# Patient Record
Sex: FEMALE
[De-identification: names, ages, dates, MRNs, and addresses within clinical notes are randomized per-mention and may not be internally consistent; named-entity substitution may affect disease eponyms.]

---

## 2017-02-24 ENCOUNTER — OTHER (OUTPATIENT)
Age: 32
End: 2017-02-24

## 2017-04-12 ENCOUNTER — RECORD ABSTRACTING (OUTPATIENT)
Age: 32
End: 2017-04-12

## 2017-04-12 DIAGNOSIS — Z83.42 FAMILY HISTORY OF FAMILIAL HYPERCHOLESTEROLEMIA: ICD-10-CM

## 2017-04-12 DIAGNOSIS — Z80.42 FAMILY HISTORY OF MALIGNANT NEOPLASM OF PROSTATE: ICD-10-CM

## 2017-04-12 DIAGNOSIS — Z78.9 OTHER SPECIFIED HEALTH STATUS: ICD-10-CM

## 2017-04-12 DIAGNOSIS — Z31.69 ENCOUNTER FOR OTHER GENERAL COUNSELING AND ADVICE ON PROCREATION: ICD-10-CM

## 2017-05-02 ENCOUNTER — APPOINTMENT (OUTPATIENT)
Dept: MATERNAL FETAL MEDICINE | Facility: CLINIC | Age: 32
End: 2017-05-02

## 2017-05-02 ENCOUNTER — APPOINTMENT (OUTPATIENT)
Dept: ANTEPARTUM | Facility: CLINIC | Age: 32
End: 2017-05-02

## 2017-05-02 VITALS
BODY MASS INDEX: 20.14 KG/M2 | WEIGHT: 118 LBS | HEIGHT: 64 IN | SYSTOLIC BLOOD PRESSURE: 96 MMHG | DIASTOLIC BLOOD PRESSURE: 62 MMHG

## 2017-05-02 DIAGNOSIS — Z91.89 OTHER SPECIFIED PERSONAL RISK FACTORS, NOT ELSEWHERE CLASSIFIED: ICD-10-CM

## 2017-05-03 PROBLEM — Z91.89 AT RISK FOR INFECTIOUS DISEASE DUE TO RECENT FOREIGN TRAVEL: Status: ACTIVE | Noted: 2017-05-03

## 2017-08-23 ENCOUNTER — RESULT REVIEW (OUTPATIENT)
Age: 32
End: 2017-08-23

## 2017-08-31 ENCOUNTER — APPOINTMENT (OUTPATIENT)
Dept: ULTRASOUND IMAGING | Facility: CLINIC | Age: 32
End: 2017-08-31
Payer: COMMERCIAL

## 2017-08-31 ENCOUNTER — OUTPATIENT (OUTPATIENT)
Dept: OUTPATIENT SERVICES | Facility: HOSPITAL | Age: 32
LOS: 1 days | End: 2017-08-31
Payer: COMMERCIAL

## 2017-08-31 DIAGNOSIS — Z00.8 ENCOUNTER FOR OTHER GENERAL EXAMINATION: ICD-10-CM

## 2017-08-31 PROCEDURE — 76641 ULTRASOUND BREAST COMPLETE: CPT

## 2017-08-31 PROCEDURE — 76641 ULTRASOUND BREAST COMPLETE: CPT | Mod: 26,50

## 2017-09-21 ENCOUNTER — RX RENEWAL (OUTPATIENT)
Age: 32
End: 2017-09-21

## 2017-12-13 ENCOUNTER — APPOINTMENT (OUTPATIENT)
Dept: ANTEPARTUM | Facility: CLINIC | Age: 32
End: 2017-12-13
Payer: COMMERCIAL

## 2017-12-13 ENCOUNTER — ASOB RESULT (OUTPATIENT)
Age: 32
End: 2017-12-13

## 2017-12-13 PROCEDURE — 76811 OB US DETAILED SNGL FETUS: CPT

## 2018-04-27 ENCOUNTER — INPATIENT (INPATIENT)
Facility: HOSPITAL | Age: 33
LOS: 1 days | Discharge: HOME | End: 2018-04-29
Attending: OBSTETRICS & GYNECOLOGY | Admitting: OBSTETRICS & GYNECOLOGY
Payer: COMMERCIAL

## 2018-04-27 VITALS
OXYGEN SATURATION: 100 % | RESPIRATION RATE: 16 BRPM | DIASTOLIC BLOOD PRESSURE: 52 MMHG | HEART RATE: 69 BPM | TEMPERATURE: 98 F | SYSTOLIC BLOOD PRESSURE: 103 MMHG

## 2018-04-27 DIAGNOSIS — Z34.80 ENCOUNTER FOR SUPERVISION OF OTHER NORMAL PREGNANCY, UNSPECIFIED TRIMESTER: ICD-10-CM

## 2018-04-27 DIAGNOSIS — O26.899 OTHER SPECIFIED PREGNANCY RELATED CONDITIONS, UNSPECIFIED TRIMESTER: ICD-10-CM

## 2018-04-27 DIAGNOSIS — Z3A.00 WEEKS OF GESTATION OF PREGNANCY NOT SPECIFIED: ICD-10-CM

## 2018-04-27 LAB
BASOPHILS # BLD AUTO: 0 K/UL — SIGNIFICANT CHANGE UP (ref 0–0.2)
BASOPHILS NFR BLD AUTO: 0.3 % — SIGNIFICANT CHANGE UP (ref 0–2)
BLD GP AB SCN SERPL QL: NEGATIVE — SIGNIFICANT CHANGE UP
EOSINOPHIL # BLD AUTO: 0 K/UL — SIGNIFICANT CHANGE UP (ref 0–0.5)
EOSINOPHIL NFR BLD AUTO: 0.1 % — SIGNIFICANT CHANGE UP (ref 0–6)
HCT VFR BLD CALC: 36.8 % — SIGNIFICANT CHANGE UP (ref 34.5–45)
HGB BLD-MCNC: 12.7 G/DL — SIGNIFICANT CHANGE UP (ref 11.5–15.5)
LYMPHOCYTES # BLD AUTO: 1.1 K/UL — SIGNIFICANT CHANGE UP (ref 1–3.3)
LYMPHOCYTES # BLD AUTO: 12.1 % — LOW (ref 13–44)
MCHC RBC-ENTMCNC: 31 PG — SIGNIFICANT CHANGE UP (ref 27–34)
MCHC RBC-ENTMCNC: 34.5 GM/DL — SIGNIFICANT CHANGE UP (ref 32–36)
MCV RBC AUTO: 89.9 FL — SIGNIFICANT CHANGE UP (ref 80–100)
MONOCYTES # BLD AUTO: 0.6 K/UL — SIGNIFICANT CHANGE UP (ref 0–0.9)
MONOCYTES NFR BLD AUTO: 6.6 % — SIGNIFICANT CHANGE UP (ref 2–14)
NEUTROPHILS # BLD AUTO: 7.1 K/UL — SIGNIFICANT CHANGE UP (ref 1.8–7.4)
NEUTROPHILS NFR BLD AUTO: 80.8 % — HIGH (ref 43–77)
PLATELET # BLD AUTO: 123 K/UL — LOW (ref 150–400)
RBC # BLD: 4.09 M/UL — SIGNIFICANT CHANGE UP (ref 3.8–5.2)
RBC # FLD: 11.7 % — SIGNIFICANT CHANGE UP (ref 10.3–14.5)
RH IG SCN BLD-IMP: POSITIVE — SIGNIFICANT CHANGE UP
WBC # BLD: 8.8 K/UL — SIGNIFICANT CHANGE UP (ref 3.8–10.5)
WBC # FLD AUTO: 8.8 K/UL — SIGNIFICANT CHANGE UP (ref 3.8–10.5)

## 2018-04-27 RX ORDER — AMPICILLIN TRIHYDRATE 250 MG
2 CAPSULE ORAL ONCE
Qty: 0 | Refills: 0 | Status: COMPLETED | OUTPATIENT
Start: 2018-04-27 | End: 2018-04-27

## 2018-04-27 RX ORDER — DIBUCAINE 1 %
1 OINTMENT (GRAM) RECTAL EVERY 4 HOURS
Qty: 0 | Refills: 0 | Status: DISCONTINUED | OUTPATIENT
Start: 2018-04-27 | End: 2018-04-28

## 2018-04-27 RX ORDER — DOCUSATE SODIUM 100 MG
100 CAPSULE ORAL
Qty: 0 | Refills: 0 | Status: DISCONTINUED | OUTPATIENT
Start: 2018-04-28 | End: 2018-04-29

## 2018-04-27 RX ORDER — HYDROCORTISONE 1 %
1 OINTMENT (GRAM) TOPICAL EVERY 4 HOURS
Qty: 0 | Refills: 0 | Status: DISCONTINUED | OUTPATIENT
Start: 2018-04-27 | End: 2018-04-28

## 2018-04-27 RX ORDER — TETANUS TOXOID, REDUCED DIPHTHERIA TOXOID AND ACELLULAR PERTUSSIS VACCINE, ADSORBED 5; 2.5; 8; 8; 2.5 [IU]/.5ML; [IU]/.5ML; UG/.5ML; UG/.5ML; UG/.5ML
0.5 SUSPENSION INTRAMUSCULAR ONCE
Qty: 0 | Refills: 0 | Status: DISCONTINUED | OUTPATIENT
Start: 2018-04-28 | End: 2018-04-29

## 2018-04-27 RX ORDER — LANOLIN
1 OINTMENT (GRAM) TOPICAL EVERY 6 HOURS
Qty: 0 | Refills: 0 | Status: DISCONTINUED | OUTPATIENT
Start: 2018-04-28 | End: 2018-04-29

## 2018-04-27 RX ORDER — GLYCERIN ADULT
1 SUPPOSITORY, RECTAL RECTAL AT BEDTIME
Qty: 0 | Refills: 0 | Status: DISCONTINUED | OUTPATIENT
Start: 2018-04-28 | End: 2018-04-29

## 2018-04-27 RX ORDER — OXYTOCIN 10 UNIT/ML
41.67 VIAL (ML) INJECTION
Qty: 20 | Refills: 0 | Status: DISCONTINUED | OUTPATIENT
Start: 2018-04-28 | End: 2018-04-29

## 2018-04-27 RX ORDER — OXYCODONE HYDROCHLORIDE 5 MG/1
5 TABLET ORAL
Qty: 0 | Refills: 0 | Status: DISCONTINUED | OUTPATIENT
Start: 2018-04-27 | End: 2018-04-29

## 2018-04-27 RX ORDER — SODIUM CHLORIDE 9 MG/ML
3 INJECTION INTRAMUSCULAR; INTRAVENOUS; SUBCUTANEOUS EVERY 8 HOURS
Qty: 0 | Refills: 0 | Status: DISCONTINUED | OUTPATIENT
Start: 2018-04-28 | End: 2018-04-29

## 2018-04-27 RX ORDER — SODIUM CHLORIDE 9 MG/ML
1000 INJECTION, SOLUTION INTRAVENOUS
Qty: 0 | Refills: 0 | Status: DISCONTINUED | OUTPATIENT
Start: 2018-04-27 | End: 2018-04-28

## 2018-04-27 RX ORDER — AMPICILLIN TRIHYDRATE 250 MG
CAPSULE ORAL
Qty: 0 | Refills: 0 | Status: DISCONTINUED | OUTPATIENT
Start: 2018-04-27 | End: 2018-04-27

## 2018-04-27 RX ORDER — SODIUM CHLORIDE 9 MG/ML
1000 INJECTION, SOLUTION INTRAVENOUS ONCE
Qty: 0 | Refills: 0 | Status: COMPLETED | OUTPATIENT
Start: 2018-04-27 | End: 2018-04-27

## 2018-04-27 RX ORDER — PRAMOXINE HYDROCHLORIDE 150 MG/15G
1 AEROSOL, FOAM RECTAL EVERY 4 HOURS
Qty: 0 | Refills: 0 | Status: DISCONTINUED | OUTPATIENT
Start: 2018-04-27 | End: 2018-04-28

## 2018-04-27 RX ORDER — OXYTOCIN 10 UNIT/ML
125 VIAL (ML) INJECTION
Qty: 30 | Refills: 0 | Status: DISCONTINUED | OUTPATIENT
Start: 2018-04-27 | End: 2018-04-27

## 2018-04-27 RX ORDER — DIPHENHYDRAMINE HCL 50 MG
25 CAPSULE ORAL EVERY 6 HOURS
Qty: 0 | Refills: 0 | Status: DISCONTINUED | OUTPATIENT
Start: 2018-04-28 | End: 2018-04-29

## 2018-04-27 RX ORDER — CITRIC ACID/SODIUM CITRATE 300-500 MG
15 SOLUTION, ORAL ORAL EVERY 4 HOURS
Qty: 0 | Refills: 0 | Status: DISCONTINUED | OUTPATIENT
Start: 2018-04-27 | End: 2018-04-27

## 2018-04-27 RX ORDER — DIBUCAINE 1 %
1 OINTMENT (GRAM) RECTAL EVERY 4 HOURS
Qty: 0 | Refills: 0 | Status: DISCONTINUED | OUTPATIENT
Start: 2018-04-28 | End: 2018-04-29

## 2018-04-27 RX ORDER — AMPICILLIN TRIHYDRATE 250 MG
1 CAPSULE ORAL EVERY 4 HOURS
Qty: 0 | Refills: 0 | Status: DISCONTINUED | OUTPATIENT
Start: 2018-04-27 | End: 2018-04-27

## 2018-04-27 RX ORDER — SIMETHICONE 80 MG/1
80 TABLET, CHEWABLE ORAL EVERY 6 HOURS
Qty: 0 | Refills: 0 | Status: DISCONTINUED | OUTPATIENT
Start: 2018-04-28 | End: 2018-04-29

## 2018-04-27 RX ORDER — IBUPROFEN 200 MG
600 TABLET ORAL EVERY 6 HOURS
Qty: 0 | Refills: 0 | Status: COMPLETED | OUTPATIENT
Start: 2018-04-27 | End: 2019-03-26

## 2018-04-27 RX ORDER — OXYTOCIN 10 UNIT/ML
333.33 VIAL (ML) INJECTION
Qty: 20 | Refills: 0 | Status: COMPLETED | OUTPATIENT
Start: 2018-04-27

## 2018-04-27 RX ORDER — ACETAMINOPHEN 500 MG
975 TABLET ORAL EVERY 6 HOURS
Qty: 0 | Refills: 0 | Status: COMPLETED | OUTPATIENT
Start: 2018-04-27 | End: 2019-03-26

## 2018-04-27 RX ORDER — KETOROLAC TROMETHAMINE 30 MG/ML
30 SYRINGE (ML) INJECTION ONCE
Qty: 0 | Refills: 0 | Status: DISCONTINUED | OUTPATIENT
Start: 2018-04-27 | End: 2018-04-27

## 2018-04-27 RX ORDER — AER TRAVELER 0.5 G/1
1 SOLUTION RECTAL; TOPICAL EVERY 4 HOURS
Qty: 0 | Refills: 0 | Status: DISCONTINUED | OUTPATIENT
Start: 2018-04-27 | End: 2018-04-28

## 2018-04-27 RX ORDER — SODIUM CHLORIDE 9 MG/ML
3 INJECTION INTRAMUSCULAR; INTRAVENOUS; SUBCUTANEOUS EVERY 8 HOURS
Qty: 0 | Refills: 0 | Status: DISCONTINUED | OUTPATIENT
Start: 2018-04-27 | End: 2018-04-28

## 2018-04-27 RX ORDER — AER TRAVELER 0.5 G/1
1 SOLUTION RECTAL; TOPICAL EVERY 4 HOURS
Qty: 0 | Refills: 0 | Status: DISCONTINUED | OUTPATIENT
Start: 2018-04-28 | End: 2018-04-29

## 2018-04-27 RX ORDER — OXYCODONE HYDROCHLORIDE 5 MG/1
5 TABLET ORAL EVERY 4 HOURS
Qty: 0 | Refills: 0 | Status: DISCONTINUED | OUTPATIENT
Start: 2018-04-27 | End: 2018-04-29

## 2018-04-27 RX ORDER — OXYTOCIN 10 UNIT/ML
333.33 VIAL (ML) INJECTION
Qty: 20 | Refills: 0 | Status: DISCONTINUED | OUTPATIENT
Start: 2018-04-27 | End: 2018-04-27

## 2018-04-27 RX ORDER — HYDROCORTISONE 1 %
1 OINTMENT (GRAM) TOPICAL EVERY 4 HOURS
Qty: 0 | Refills: 0 | Status: DISCONTINUED | OUTPATIENT
Start: 2018-04-28 | End: 2018-04-29

## 2018-04-27 RX ORDER — MAGNESIUM HYDROXIDE 400 MG/1
30 TABLET, CHEWABLE ORAL
Qty: 0 | Refills: 0 | Status: DISCONTINUED | OUTPATIENT
Start: 2018-04-28 | End: 2018-04-29

## 2018-04-27 RX ORDER — OXYTOCIN 10 UNIT/ML
41.67 VIAL (ML) INJECTION
Qty: 20 | Refills: 0 | Status: DISCONTINUED | OUTPATIENT
Start: 2018-04-27 | End: 2018-04-29

## 2018-04-27 RX ORDER — PRAMOXINE HYDROCHLORIDE 150 MG/15G
1 AEROSOL, FOAM RECTAL EVERY 4 HOURS
Qty: 0 | Refills: 0 | Status: DISCONTINUED | OUTPATIENT
Start: 2018-04-28 | End: 2018-04-29

## 2018-04-27 RX ADMIN — SODIUM CHLORIDE 2000 MILLILITER(S): 9 INJECTION, SOLUTION INTRAVENOUS at 14:30

## 2018-04-27 RX ADMIN — Medication 216 GRAM(S): at 14:25

## 2018-04-27 RX ADMIN — Medication 30 MILLIGRAM(S): at 21:00

## 2018-04-27 RX ADMIN — Medication 208 GRAM(S): at 18:25

## 2018-04-28 LAB
HCT VFR BLD CALC: 32.7 % — LOW (ref 34.5–45)
HGB BLD-MCNC: 10.7 G/DL — LOW (ref 11.5–15.5)
T PALLIDUM AB TITR SER: NEGATIVE — SIGNIFICANT CHANGE UP

## 2018-04-28 RX ORDER — IBUPROFEN 200 MG
600 TABLET ORAL EVERY 6 HOURS
Qty: 0 | Refills: 0 | Status: DISCONTINUED | OUTPATIENT
Start: 2018-04-28 | End: 2018-04-29

## 2018-04-28 RX ORDER — ACETAMINOPHEN 500 MG
975 TABLET ORAL EVERY 6 HOURS
Qty: 0 | Refills: 0 | Status: DISCONTINUED | OUTPATIENT
Start: 2018-04-28 | End: 2018-04-29

## 2018-04-28 RX ADMIN — Medication 600 MILLIGRAM(S): at 09:40

## 2018-04-28 RX ADMIN — Medication 600 MILLIGRAM(S): at 23:15

## 2018-04-28 RX ADMIN — Medication 600 MILLIGRAM(S): at 16:00

## 2018-04-28 RX ADMIN — Medication 600 MILLIGRAM(S): at 22:47

## 2018-04-28 RX ADMIN — Medication 600 MILLIGRAM(S): at 08:42

## 2018-04-28 RX ADMIN — Medication 975 MILLIGRAM(S): at 18:38

## 2018-04-28 RX ADMIN — Medication 600 MILLIGRAM(S): at 15:02

## 2018-04-28 NOTE — PROGRESS NOTE ADULT - PROBLEM SELECTOR PLAN 1
- Pain well controlled, continue current pain regimen  - Increase ambulation, SCDs when not ambulating  - Continue regular diet    Guadalupe Steward PGY-1

## 2018-04-29 ENCOUNTER — TRANSCRIPTION ENCOUNTER (OUTPATIENT)
Age: 33
End: 2018-04-29

## 2018-04-29 VITALS
HEART RATE: 69 BPM | RESPIRATION RATE: 16 BRPM | DIASTOLIC BLOOD PRESSURE: 60 MMHG | TEMPERATURE: 98 F | SYSTOLIC BLOOD PRESSURE: 92 MMHG

## 2018-04-29 PROCEDURE — 85014 HEMATOCRIT: CPT

## 2018-04-29 PROCEDURE — 59050 FETAL MONITOR W/REPORT: CPT

## 2018-04-29 PROCEDURE — G0463: CPT

## 2018-04-29 PROCEDURE — 86780 TREPONEMA PALLIDUM: CPT

## 2018-04-29 PROCEDURE — 86901 BLOOD TYPING SEROLOGIC RH(D): CPT

## 2018-04-29 PROCEDURE — 86850 RBC ANTIBODY SCREEN: CPT

## 2018-04-29 PROCEDURE — 59025 FETAL NON-STRESS TEST: CPT

## 2018-04-29 PROCEDURE — 86900 BLOOD TYPING SEROLOGIC ABO: CPT

## 2018-04-29 PROCEDURE — 85027 COMPLETE CBC AUTOMATED: CPT

## 2018-04-29 PROCEDURE — 85018 HEMOGLOBIN: CPT

## 2018-04-29 RX ORDER — IBUPROFEN 200 MG
1 TABLET ORAL
Qty: 0 | Refills: 0 | COMMUNITY
Start: 2018-04-29

## 2018-04-29 RX ORDER — ACETAMINOPHEN 500 MG
3 TABLET ORAL
Qty: 0 | Refills: 0 | COMMUNITY
Start: 2018-04-29

## 2018-04-29 RX ADMIN — Medication 975 MILLIGRAM(S): at 09:28

## 2018-04-29 RX ADMIN — Medication 600 MILLIGRAM(S): at 06:15

## 2018-04-29 RX ADMIN — Medication 600 MILLIGRAM(S): at 05:48

## 2018-04-29 RX ADMIN — Medication 1 TABLET(S): at 09:28

## 2018-04-29 RX ADMIN — Medication 600 MILLIGRAM(S): at 12:18

## 2018-04-29 RX ADMIN — Medication 975 MILLIGRAM(S): at 10:30

## 2018-04-29 RX ADMIN — Medication 100 MILLIGRAM(S): at 05:48

## 2018-04-29 NOTE — PROGRESS NOTE ADULT - SUBJECTIVE AND OBJECTIVE BOX
OB Attending Note    S: Patient doing well. Minimal lochia. Pain controlled.    O: Vital Signs Last 24 Hrs  T(C): 36.6 (2018 05:58), Max: 36.6 (2018 05:58)  T(F): 97.8 (2018 05:58), Max: 97.8 (2018 05:58)  HR: 69 (2018 05:58) (69 - 75)  BP: 92/60 (2018 05:58) (92/60 - 104/66)  BP(mean): --  RR: 16 (2018 05:58) (16 - 16)  SpO2: --    Gen: NAD  Abd: soft, NT, ND, fundus firm below umbilicus  Lochia: min  Perineum healing well  Ext: no tenderness    Labs:                        10.7   x     )-----------( x        ( 2018 08:27 )             32.7       A: 33y PPD# s/p  doing well.    Plan: cont PP care  OOB/ambulation  Pain control
OB Progress Note:  PPD#1    S: 32yo PPD#1 s/p . Patient feels well. Pain is well controlled. She is tolerating a regular diet and passing flatus. She is voiding spontaneously, and ambulating without difficulty. Denies CP/SOB. Denies lightheadedness/dizziness. Denies N/V.    O:  Vitals:  Vital Signs Last 24 Hrs  T(C): 36.4 (2018 01:20), Max: 36.8 (2018 20:20)  T(F): 97.5 (2018 01:20), Max: 98.2 (2018 20:20)  HR: 77 (2018 01:20) (69 - 83)  BP: 103/69 (2018 01:20) (103/52 - 113/64)  BP(mean): --  RR: 17 (2018 01:20) (16 - 17)  SpO2: 100% (2018 01:20) (100% - 100%)    MEDICATIONS  (STANDING):  acetaminophen   Tablet. 975 milliGRAM(s) Oral every 6 hours  diphtheria/tetanus/pertussis (acellular) Vaccine (ADAcel) 0.5 milliLiter(s) IntraMuscular once  ibuprofen  Tablet 600 milliGRAM(s) Oral every 6 hours  oxyCODONE    IR 5 milliGRAM(s) Oral every 3 hours  oxytocin Infusion 41.667 milliUNIT(s)/Min (125 mL/Hr) IV Continuous <Continuous>  oxytocin Infusion 41.667 milliUNIT(s)/Min (125 mL/Hr) IV Continuous <Continuous>  prenatal multivitamin 1 Tablet(s) Oral daily  sodium chloride 0.9% lock flush 3 milliLiter(s) IV Push every 8 hours      Labs:  Blood type: A Positive  Rubella IgG: RPR: Negative                          12.7   8.8 >-----------< 123<L>    (  @ 14:30 )             36.8                  Physical Exam:  General: NAD  Abdomen: soft, non-tender, non-distended, fundus firm  Vaginal: Lochia wnl  Extremities: No erythema/edema
Post-Anesthetic Evaluation:    The Patient was interviewed and evaluated    Vital Signs Last 24 Hrs  T(C): 36.5 (28 Apr 2018 09:00), Max: 37.1 (28 Apr 2018 06:49)  T(F): 97.7 (28 Apr 2018 09:00), Max: 98.7 (28 Apr 2018 06:49)  HR: 76 (28 Apr 2018 09:00) (69 - 83)  BP: 101/66 (28 Apr 2018 09:00) (93/60 - 113/64)  BP(mean): --  RR: 18 (28 Apr 2018 09:00) (16 - 18)  SpO2: 100% (28 Apr 2018 01:20) (100% - 100%)    Evaluation:      (X) Patient complains of small area of persistent numbness left lower shin.  Potentially a nerve compression injury.  Pain service notified.  Will continue to monitor.   No other apparent complications or complaints regarding anesthesia care at this time  (X) All questions were answered    Condition:  (X) Stable      ( ) Guarded      ( ) Critical    Recommendations:  (X) None     ( ) Other:

## 2018-04-29 NOTE — DISCHARGE NOTE OB - CARE PLAN
Principal Discharge DX:	 (normal spontaneous vaginal delivery)  Goal:	Routine postpartum care  Assessment and plan of treatment:	Please call office for 6 week postpartum appointment.

## 2018-04-29 NOTE — DISCHARGE NOTE OB - MATERIALS PROVIDED
[facility] [place] Screening Program/Back To Sleep Handout/Breastfeeding Guide and Packet/Breastfeeding Log/Breastfeeding Mother’s Support Group Information/Guide to Postpartum Care/Birth Certificate Instructions/[facility] Hearing Screen Program

## 2018-04-29 NOTE — DISCHARGE NOTE OB - NS OB DC PAIN RELIEF
If you have episitomy or tear repair, use warm water sitz bath for relief of discomfort/Tylenol for minor pain as directed/Other pain medication as prescribed and directed/If you have episitomy or tear repair, use anesthetic spray or cream as directed by your healthcare provider for relief of discomfort

## 2018-04-29 NOTE — DISCHARGE NOTE OB - HOSPITAL COURSE
Patient admitted in early labor, received augmentation and had normal vaginal delivery.  Patient with uncomplicated postpartum course and was stable for discharge home on PPD2.

## 2018-04-29 NOTE — DISCHARGE NOTE OB - MEDICATION SUMMARY - MEDICATIONS TO TAKE
I will START or STAY ON the medications listed below when I get home from the hospital:    acetaminophen 325 mg oral tablet  -- 3 tab(s) by mouth every 6 hours  -- Indication: For pain    ibuprofen 600 mg oral tablet  -- 1 tab(s) by mouth every 6 hours  -- Indication: For pain    Prenatal Multivitamins with Folic Acid 1 mg oral tablet  -- 1 tab(s) by mouth once a day  -- Indication: For  (normal spontaneous vaginal delivery)

## 2018-04-29 NOTE — DISCHARGE NOTE OB - PATIENT PORTAL LINK FT
You can access the [facility] Patient Portal, offered by [facility], by registering with the following website: [email or web address]

## 2018-04-29 NOTE — PROGRESS NOTE ADULT - ASSESSMENT
PPD2 s/p , doing well   -Discharge home   -Precautions given   -Follow-up in office in 6 weeks      Verito Barillas MD
A/P: 32yo   PPD#1 s/p .  Patient is stable and doing well post-partum.

## 2018-04-29 NOTE — DISCHARGE NOTE OB - CARE PROVIDER_API CALL
Roxanne Laurent), Obstetrics and Gynecology  [address] [place] [address]  Phone: [phone]  Fax: [phone]

## 2019-12-16 ENCOUNTER — RESULT REVIEW (OUTPATIENT)
Age: 34
End: 2019-12-16

## 2020-04-17 ENCOUNTER — OUTPATIENT (OUTPATIENT)
Dept: OUTPATIENT SERVICES | Facility: HOSPITAL | Age: 35
LOS: 1 days | End: 2020-04-17
Payer: COMMERCIAL

## 2020-04-17 ENCOUNTER — APPOINTMENT (OUTPATIENT)
Dept: ULTRASOUND IMAGING | Facility: CLINIC | Age: 35
End: 2020-04-17
Payer: COMMERCIAL

## 2020-04-17 ENCOUNTER — APPOINTMENT (OUTPATIENT)
Dept: MAMMOGRAPHY | Facility: CLINIC | Age: 35
End: 2020-04-17
Payer: COMMERCIAL

## 2020-04-17 DIAGNOSIS — N63.0 UNSPECIFIED LUMP IN UNSPECIFIED BREAST: ICD-10-CM

## 2020-04-17 PROCEDURE — G0279: CPT | Mod: 26

## 2020-04-17 PROCEDURE — 77066 DX MAMMO INCL CAD BI: CPT | Mod: 26

## 2020-04-17 PROCEDURE — 77066 DX MAMMO INCL CAD BI: CPT

## 2020-04-17 PROCEDURE — 76641 ULTRASOUND BREAST COMPLETE: CPT | Mod: 26,50

## 2020-04-17 PROCEDURE — 76641 ULTRASOUND BREAST COMPLETE: CPT

## 2020-04-17 PROCEDURE — G0279: CPT

## 2020-04-21 ENCOUNTER — TRANSCRIPTION ENCOUNTER (OUTPATIENT)
Age: 35
End: 2020-04-21

## 2020-11-25 ENCOUNTER — APPOINTMENT (OUTPATIENT)
Dept: SURGICAL ONCOLOGY | Facility: CLINIC | Age: 35
End: 2020-11-25
Payer: COMMERCIAL

## 2020-11-25 VITALS
SYSTOLIC BLOOD PRESSURE: 125 MMHG | HEIGHT: 64 IN | RESPIRATION RATE: 15 BRPM | HEART RATE: 53 BPM | OXYGEN SATURATION: 99 % | WEIGHT: 120 LBS | BODY MASS INDEX: 20.49 KG/M2 | DIASTOLIC BLOOD PRESSURE: 81 MMHG

## 2020-11-25 DIAGNOSIS — N60.19 DIFFUSE CYSTIC MASTOPATHY OF UNSPECIFIED BREAST: ICD-10-CM

## 2020-11-25 PROCEDURE — 99204 OFFICE O/P NEW MOD 45 MIN: CPT

## 2020-11-25 PROCEDURE — 10005 FNA BX W/US GDN 1ST LES: CPT

## 2020-11-27 NOTE — HISTORY OF PRESENT ILLNESS
[de-identified] : Fabienne Murillo is 35 year old female who presents today for initial consultation for right breast mass.  She was referred to me by Dr. Chanel Salinas. \par \par Bilateral Mammo/Sono 04/17/20: No mammographic evidence of malignancy. Right Breast- no suspicious mass; mixed fatty and fibroglandular tissue. Left Breast- no suspicious solid mass; At 2-3 o'clock 10 cm from nipple is a benign appearing inframammary lymph node measuring 5 x 2 x 6 mm. BI-RADS 2- Benign Findings, Recommend Clinical Follow-up.\par \par She has a history of fibrocystic breasts with a previous right breast cyst aspiration in 2017. Past surgical history includes breast augmentation. She has no personal history of cancer. Her family history includes father- CAD & Prostate CA, paternal grandfather- Lung CA, and maternal grandfather- Bone CA. She has never smoked and uses alcohol socially.

## 2020-11-27 NOTE — ASSESSMENT
[FreeTextEntry1] : Right breast hypoechoic mass\par Moderate index of suspicion given solid nature along with irregular appearance on ultrasound\par Await results from today's biopsy\par May need core biopsy if nondiagnostic

## 2020-11-27 NOTE — PHYSICAL EXAM
[Normal] : supple, no neck mass and thyroid not enlarged [Normal Neck Lymph Nodes] : normal neck lymph nodes  [Normal Supraclavicular Lymph Nodes] : normal supraclavicular lymph nodes [Normal Groin Lymph Nodes] : normal groin lymph nodes [Normal Axillary Lymph Nodes] : normal axillary lymph nodes [Normal] : oriented to person, place and time, with appropriate affect [de-identified] : Right breast nodule 12:00 1.5 cm diameter, ultrasound shows irregular hypoechoic complex mass possibly representing complex cyst, no other masses or adenopathy bilaterally

## 2020-11-27 NOTE — CONSULT LETTER
[Dear  ___] : Dear  [unfilled], [Consult Letter:] : I had the pleasure of evaluating your patient, [unfilled]. [Please see my note below.] : Please see my note below. [Sincerely,] : Sincerely, [FreeTextEntry2] : Chanel Salinas [FreeTextEntry3] : Wild Hobbs MD, FACS, FSSO

## 2020-12-01 LAB — FNA, BREAST: NORMAL

## 2020-12-09 ENCOUNTER — OUTPATIENT (OUTPATIENT)
Dept: OUTPATIENT SERVICES | Facility: HOSPITAL | Age: 35
LOS: 1 days | End: 2020-12-09

## 2020-12-09 ENCOUNTER — APPOINTMENT (OUTPATIENT)
Dept: ULTRASOUND IMAGING | Facility: CLINIC | Age: 35
End: 2020-12-09
Payer: COMMERCIAL

## 2020-12-09 ENCOUNTER — APPOINTMENT (OUTPATIENT)
Dept: MAMMOGRAPHY | Facility: CLINIC | Age: 35
End: 2020-12-09
Payer: COMMERCIAL

## 2020-12-09 ENCOUNTER — RESULT REVIEW (OUTPATIENT)
Age: 35
End: 2020-12-09

## 2020-12-09 ENCOUNTER — NON-APPOINTMENT (OUTPATIENT)
Age: 35
End: 2020-12-09

## 2020-12-09 PROCEDURE — 76641 ULTRASOUND BREAST COMPLETE: CPT | Mod: 26,50

## 2020-12-09 PROCEDURE — 77066 DX MAMMO INCL CAD BI: CPT | Mod: 26

## 2020-12-09 PROCEDURE — G0279: CPT | Mod: 26

## 2020-12-10 ENCOUNTER — APPOINTMENT (OUTPATIENT)
Dept: SURGICAL ONCOLOGY | Facility: CLINIC | Age: 35
End: 2020-12-10
Payer: COMMERCIAL

## 2020-12-10 ENCOUNTER — RESULT REVIEW (OUTPATIENT)
Age: 35
End: 2020-12-10

## 2020-12-10 DIAGNOSIS — N63.10 UNSPECIFIED LUMP IN THE RIGHT BREAST, UNSPECIFIED QUADRANT: ICD-10-CM

## 2020-12-10 PROCEDURE — 19083 BX BREAST 1ST LESION US IMAG: CPT

## 2020-12-10 PROCEDURE — 99072 ADDL SUPL MATRL&STAF TM PHE: CPT

## 2020-12-10 PROCEDURE — 99214 OFFICE O/P EST MOD 30 MIN: CPT | Mod: 25

## 2020-12-11 ENCOUNTER — APPOINTMENT (OUTPATIENT)
Dept: ULTRASOUND IMAGING | Facility: CLINIC | Age: 35
End: 2020-12-11

## 2020-12-15 PROBLEM — N63.10 BREAST MASS, RIGHT: Status: ACTIVE | Noted: 2020-11-27

## 2020-12-16 ENCOUNTER — APPOINTMENT (OUTPATIENT)
Dept: SURGICAL ONCOLOGY | Facility: CLINIC | Age: 35
End: 2020-12-16
Payer: COMMERCIAL

## 2020-12-16 VITALS
DIASTOLIC BLOOD PRESSURE: 88 MMHG | WEIGHT: 120 LBS | RESPIRATION RATE: 15 BRPM | BODY MASS INDEX: 20.49 KG/M2 | OXYGEN SATURATION: 99 % | HEIGHT: 64 IN | HEART RATE: 69 BPM | SYSTOLIC BLOOD PRESSURE: 131 MMHG

## 2020-12-16 PROCEDURE — 99072 ADDL SUPL MATRL&STAF TM PHE: CPT

## 2020-12-16 PROCEDURE — 99215 OFFICE O/P EST HI 40 MIN: CPT

## 2020-12-16 NOTE — ADDENDUM
[FreeTextEntry1] : I, Enriqueta Newell, acted solely as a scribe for Dr. Wild Hobbs on this date 12/16/2020.\par

## 2020-12-16 NOTE — HISTORY OF PRESENT ILLNESS
[de-identified] : Fabienne Murillo is 35 year old female who presents today for a follow up visit. She is s/p biopsy of the right breast on 12/10/20.\par \par Pathology (12/10/20): \par 1. Breast, right, 12 o'clock, core biopsy\par - Invasive moderately differentiated ductal carcinoma,\par focally with tubulolobular\par features\par - [place] score 6 / 9 ( 3 + 2 + 1) in this limited\par material\par - Invasive tumor measures at least 1.0 cm\par - Ductal carcinoma in situ, cribriform with low to\par intermediate nuclear grade\par - Microcalcifications present in neoplastic breast tissue\par - Lymphovascular permeation by tumor not seen\par -ER/TN/HER-2 study in progress; an addendum will follow.\par \par 2. Breast, right, 10 o'clock, 4cm FN, core biopsy\par -  Fibrocystic changes with stromal fibrosis, focal mild\par ductal hyperplasia, adenosis,\par apocrine metaplasia and microcysts\par \par 3. Breast, right, 8cm FN, core biopsy\par - Fibroadenoma\par \par \par Bilateral Mammo/Sono 04/17/20: No mammographic evidence of malignancy. Right Breast- no suspicious mass; mixed fatty and fibroglandular tissue. Left Breast- no suspicious solid mass; At 2-3 o'clock 10 cm from nipple is a benign appearing inframammary lymph node measuring 5 x 2 x 6 mm. BI-RADS 2- Benign Findings, Recommend Clinical Follow-up.\par \par She has a history of fibrocystic breasts with a previous right breast cyst aspiration in 2017. Past surgical history includes breast augmentation. She has no personal history of cancer. Her family history includes father- CAD & Prostate CA, paternal grandfather- Lung CA, and maternal grandfather- Bone CA. She has never smoked and uses alcohol socially.

## 2020-12-16 NOTE — ASSESSMENT
[FreeTextEntry1] : Ultrasound-guided core biopsy right breast consistent with moderately differentiated invasive ductal carcinoma\par ER/IA HER-2/pop studies pending\par I had a long discussion with the patient and her  regarding her diagnosis, prognosis and all management options\par We discussed breast conservation versus bilateral mastectomy with reconstruction\par All adjuvant treatment options discussed including radiation therapy, chemotherapy, anti-HER-2 therapy and endocrine therapy\par We will get preoperative breast MRI to rule out multifocal or contralateral disease\par Patient wants staging studies -CT chest abdomen pelvis and bone scan ordered\par Genetic testing performed today\par All questions answered

## 2020-12-16 NOTE — PHYSICAL EXAM
[Normal] : supple, no neck mass and thyroid not enlarged [Normal Neck Lymph Nodes] : normal neck lymph nodes  [Normal Supraclavicular Lymph Nodes] : normal supraclavicular lymph nodes [Normal Groin Lymph Nodes] : normal groin lymph nodes [Normal Axillary Lymph Nodes] : normal axillary lymph nodes [Normal] : oriented to person, place and time, with appropriate affect [de-identified] : Right breast nodule 12:00 1.5 cm diameter, ultrasound shows irregular hypoechoic complex mass possibly representing complex cyst, no other masses or adenopathy bilaterally

## 2020-12-16 NOTE — REASON FOR VISIT
[Follow-Up Visit] : a follow-up visit for [Follow-Up MDC Visit] : a follow-up MDC visit for  [Breast Cancer] : breast cancer [FreeTextEntry2] : right breast mass

## 2020-12-19 ENCOUNTER — RESULT REVIEW (OUTPATIENT)
Age: 35
End: 2020-12-19

## 2020-12-19 ENCOUNTER — OUTPATIENT (OUTPATIENT)
Dept: OUTPATIENT SERVICES | Facility: HOSPITAL | Age: 35
LOS: 1 days | End: 2020-12-19
Payer: COMMERCIAL

## 2020-12-19 ENCOUNTER — APPOINTMENT (OUTPATIENT)
Dept: MRI IMAGING | Facility: IMAGING CENTER | Age: 35
End: 2020-12-19
Payer: COMMERCIAL

## 2020-12-19 DIAGNOSIS — N63.10 UNSPECIFIED LUMP IN THE RIGHT BREAST, UNSPECIFIED QUADRANT: ICD-10-CM

## 2020-12-19 PROCEDURE — C8908: CPT

## 2020-12-19 PROCEDURE — A9585: CPT

## 2020-12-19 PROCEDURE — C8937: CPT

## 2020-12-19 PROCEDURE — 77049 MRI BREAST C-+ W/CAD BI: CPT | Mod: 26

## 2020-12-24 ENCOUNTER — APPOINTMENT (OUTPATIENT)
Dept: NUCLEAR MEDICINE | Facility: IMAGING CENTER | Age: 35
End: 2020-12-24
Payer: COMMERCIAL

## 2020-12-24 ENCOUNTER — APPOINTMENT (OUTPATIENT)
Dept: CT IMAGING | Facility: IMAGING CENTER | Age: 35
End: 2020-12-24
Payer: COMMERCIAL

## 2020-12-24 ENCOUNTER — OUTPATIENT (OUTPATIENT)
Dept: OUTPATIENT SERVICES | Facility: HOSPITAL | Age: 35
LOS: 1 days | End: 2020-12-24
Payer: COMMERCIAL

## 2020-12-24 DIAGNOSIS — Z00.8 ENCOUNTER FOR OTHER GENERAL EXAMINATION: ICD-10-CM

## 2020-12-24 DIAGNOSIS — C50.911 MALIGNANT NEOPLASM OF UNSPECIFIED SITE OF RIGHT FEMALE BREAST: ICD-10-CM

## 2020-12-24 PROCEDURE — 71260 CT THORAX DX C+: CPT | Mod: 26

## 2020-12-24 PROCEDURE — 74177 CT ABD & PELVIS W/CONTRAST: CPT | Mod: 26

## 2020-12-24 PROCEDURE — 78306 BONE IMAGING WHOLE BODY: CPT | Mod: 26

## 2020-12-24 PROCEDURE — 74177 CT ABD & PELVIS W/CONTRAST: CPT

## 2020-12-24 PROCEDURE — 78306 BONE IMAGING WHOLE BODY: CPT

## 2020-12-24 PROCEDURE — A9561: CPT

## 2020-12-24 PROCEDURE — 71260 CT THORAX DX C+: CPT

## 2020-12-31 ENCOUNTER — APPOINTMENT (OUTPATIENT)
Dept: MAMMOGRAPHY | Facility: CLINIC | Age: 35
End: 2020-12-31

## 2020-12-31 ENCOUNTER — APPOINTMENT (OUTPATIENT)
Dept: ULTRASOUND IMAGING | Facility: CLINIC | Age: 35
End: 2020-12-31

## 2021-01-19 ENCOUNTER — APPOINTMENT (OUTPATIENT)
Dept: ULTRASOUND IMAGING | Facility: IMAGING CENTER | Age: 36
End: 2021-01-19

## 2021-01-25 ENCOUNTER — APPOINTMENT (OUTPATIENT)
Dept: SURGICAL ONCOLOGY | Facility: AMBULATORY SURGERY CENTER | Age: 36
End: 2021-01-25

## 2021-01-25 ENCOUNTER — APPOINTMENT (OUTPATIENT)
Dept: MAMMOGRAPHY | Facility: IMAGING CENTER | Age: 36
End: 2021-01-25

## 2021-02-10 ENCOUNTER — RESULT REVIEW (OUTPATIENT)
Age: 36
End: 2021-02-10

## 2021-02-16 ENCOUNTER — APPOINTMENT (OUTPATIENT)
Dept: ORTHOPEDIC SURGERY | Facility: CLINIC | Age: 36
End: 2021-02-16
Payer: COMMERCIAL

## 2021-02-16 VITALS
SYSTOLIC BLOOD PRESSURE: 102 MMHG | HEART RATE: 66 BPM | WEIGHT: 120 LBS | HEIGHT: 64 IN | OXYGEN SATURATION: 98 % | DIASTOLIC BLOOD PRESSURE: 62 MMHG | BODY MASS INDEX: 20.49 KG/M2 | TEMPERATURE: 98 F

## 2021-02-16 DIAGNOSIS — M22.41 CHONDROMALACIA PATELLAE, RIGHT KNEE: ICD-10-CM

## 2021-02-16 PROCEDURE — 99072 ADDL SUPL MATRL&STAF TM PHE: CPT

## 2021-02-16 PROCEDURE — 99203 OFFICE O/P NEW LOW 30 MIN: CPT

## 2021-02-17 PROBLEM — M22.41 PATELLOFEMORAL CHONDROSIS OF RIGHT KNEE: Status: ACTIVE | Noted: 2021-02-17

## 2021-02-17 RX ORDER — HYALURONATE SODIUM, STABILIZED 60 MG/3 ML
60 SYRINGE (ML) INTRAARTICULAR
Qty: 1 | Refills: 0 | Status: ACTIVE | COMMUNITY
Start: 2021-02-17

## 2021-02-17 NOTE — DISCUSSION/SUMMARY
[de-identified] : Ms Murillo has had ongoing right knee issues for nearly 9 years. She has subchondral edema of the patella. we will order an HA injection for her. She will continue on with activities as tolerated. All questions were answered. She will call if any issues arise

## 2021-02-17 NOTE — HISTORY OF PRESENT ILLNESS
[de-identified] : Fabienne Murillo is a 35 yo woman with ongoing right knee issues since 2012. She tripped on a metal grating and landed on her right knee. She has had persistent anterior knee pain since that time. She has tried a host of treatments including PT, activity modification and cortisone without lasting improvement. She has persistent and unrelenting anteriior knee pain. She is extremely limited in her ADLs and in her exercise program. She has pain with stairs. She was recently diagnosed with breast cancer and is currently in treatment. \par \par \par \par will order HA

## 2021-02-17 NOTE — PHYSICAL EXAM
[de-identified] : The patient is a well developed, well nourished female in no apparent distress. She is alert and oriented X 3 with a pleasant mood and appropriate affect\par \par .On physical examination of the right knee, her ROM is 0-125 degrees The patient walks with a normal gait and stands in neutral alignment. There is no effusion. No warmth or erythema is noted. The patella is tender to palpation medially . There is patellofemoral crepitus noted. The apprehension and grind tests are negative. The extensor mechanism is intact. There is no joint line tenderness. The Reji sign is absent. The Lachman and pivot shift tests are negative. There is no varus or valgus laxity at 0 or 30 degrees. No posterolateral or anteromedial laxity is noted. No masses are palpable. No other soft tissue or bony tenderness is noted.  Quadriceps weakness is noted. Neurovascular function is intact.\par \par  [de-identified] : MRI of the right knee show subchondral marrow edema in the inferolateral patella with inflammation noted in Hoffas fat pad

## 2021-02-17 NOTE — END OF VISIT
[FreeTextEntry3] : All medical record entries made by ERICA Flores, acting as a scribe for this encounter under the direction of Gabriel Cloud MD . I have reviewed the chart and agree that the record accurately reflects my personal performance of the history, physical exam, assessment and plan. I have also personally directed, reviewed, and agreed with the chart.

## 2022-02-16 ENCOUNTER — RESULT REVIEW (OUTPATIENT)
Age: 37
End: 2022-02-16

## 2022-03-08 ENCOUNTER — RESULT REVIEW (OUTPATIENT)
Age: 37
End: 2022-03-08

## 2022-06-13 ENCOUNTER — APPOINTMENT (OUTPATIENT)
Dept: PLASTIC SURGERY | Facility: CLINIC | Age: 37
End: 2022-06-13
Payer: COMMERCIAL

## 2022-06-13 VITALS
WEIGHT: 120 LBS | DIASTOLIC BLOOD PRESSURE: 71 MMHG | SYSTOLIC BLOOD PRESSURE: 112 MMHG | BODY MASS INDEX: 20.49 KG/M2 | OXYGEN SATURATION: 100 % | TEMPERATURE: 98.1 F | HEIGHT: 64 IN | HEART RATE: 69 BPM

## 2022-06-13 DIAGNOSIS — C50.911 MALIGNANT NEOPLASM OF UNSPECIFIED SITE OF RIGHT FEMALE BREAST: ICD-10-CM

## 2022-06-13 PROCEDURE — 99204 OFFICE O/P NEW MOD 45 MIN: CPT

## 2022-08-22 ENCOUNTER — NON-APPOINTMENT (OUTPATIENT)
Age: 37
End: 2022-08-22

## 2022-08-23 ENCOUNTER — APPOINTMENT (OUTPATIENT)
Dept: PLASTIC SURGERY | Facility: CLINIC | Age: 37
End: 2022-08-23

## 2022-08-23 VITALS
BODY MASS INDEX: 19.63 KG/M2 | OXYGEN SATURATION: 99 % | WEIGHT: 115 LBS | DIASTOLIC BLOOD PRESSURE: 77 MMHG | RESPIRATION RATE: 16 BRPM | HEIGHT: 64 IN | TEMPERATURE: 97.8 F | SYSTOLIC BLOOD PRESSURE: 114 MMHG | HEART RATE: 62 BPM

## 2022-08-23 DIAGNOSIS — Z85.3 PERSONAL HISTORY OF MALIGNANT NEOPLASM OF BREAST: ICD-10-CM

## 2022-08-23 DIAGNOSIS — T85.44XA CAPSULAR CONTRACTURE OF BREAST IMPLANT, INITIAL ENCOUNTER: ICD-10-CM

## 2022-08-23 PROCEDURE — 99215 OFFICE O/P EST HI 40 MIN: CPT

## 2022-08-23 NOTE — HISTORY OF PRESENT ILLNESS
[FreeTextEntry1] : Fabienne is a  37 year old female who presents for an initial consultation for breast reconstruction.  Patient has a history of bilateral breast augmentation in 2020 with SRM-[id number] submuscular silicone implants with Dr Cherise Newell.  In 2020 she felt a mass in her right breast and was diagnosed with invasive ductal carcinoma.  She underwent right lumpectomy in 2020 and subsequent radiation therapy in 2021.  Following radiation therapy, she developed capsular contracture of her right breast.  She is unhappy with the asymmetry, hardness, and discomfort of the right breast.  \par \par She denies any bleeding or clotting disorders.  Denies smoking cigarettes.  Denies any pertinent medical history.

## 2022-08-23 NOTE — REASON FOR VISIT
[Consultation] : a consultation visit [FreeTextEntry1] : Revision of breast reconstruction/augmentation.

## 2022-08-23 NOTE — PHYSICAL EXAM
[de-identified] : alert, calm, cooperative\par  [de-identified] : respirations are even and unlabored\par  [de-identified] : noticeable breast asymmetry.  Right breast with radiation skin changes and implant capsular contracture. Implants are submuscular with animation deformity.  Right IMF is about 2cm higher than left. Implant displaced superiorly. Loss of lower pole volume on right. Periareolar scar well healed and not really visible. IMF scars bilaterally. RIght IMF scar retracted inward. Left scar well healed.